# Patient Record
(demographics unavailable — no encounter records)

---

## 2025-04-21 NOTE — HISTORY OF PRESENT ILLNESS
[de-identified] : 75-year-old female comes in today for an evaluation of her left shoulder pain has been going on now for the past 2 weeks.  She cannot recall specific trauma although she is very active she plays pickle ball and exercises gym frequently.  Left hand dominant. Other than over-the-counter anti-inflammatory, she reports that she is healthy not taking any medication.

## 2025-04-21 NOTE — ASSESSMENT
[FreeTextEntry1] : At this time we went over options.  We did discuss possible overuse/adhesive capsulitis.  I am recommending rest, physical therapy.  Meloxicam sent to the pharmacy.  Follow-up in several weeks if the pain worsens or continues we will consider further imaging with MRI.

## 2025-04-21 NOTE — IMAGING
[de-identified] : On examination of the left shoulder active forward flexion and abduction to 160 degrees, restriction through internal and external rotation when compared to the right shoulder.  Negative drop arm, fairly good rotator cuff strength.  Mild pain with [place].  X-ray left shoulder 3 views obtained in the office today no fracture or dislocation